# Patient Record
Sex: FEMALE | Race: WHITE | Employment: FULL TIME | URBAN - METROPOLITAN AREA
[De-identification: names, ages, dates, MRNs, and addresses within clinical notes are randomized per-mention and may not be internally consistent; named-entity substitution may affect disease eponyms.]

---

## 2024-03-05 ENCOUNTER — HOSPITAL ENCOUNTER (EMERGENCY)
Age: 19
Discharge: HOME | End: 2024-03-06
Payer: COMMERCIAL

## 2024-03-05 DIAGNOSIS — R10.9 ABDOMINAL PAIN, UNSPECIFIED ABDOMINAL LOCATION: Primary | ICD-10-CM

## 2024-03-05 LAB
ALBUMIN SERPL-MCNC: 4.4 G/DL (ref 3.4–5)
ALBUMIN/GLOB SERPL: 1.7 {RATIO} (ref 1.1–2.2)
ALP SERPL-CCNC: 49 U/L (ref 40–129)
ALT SERPL-CCNC: 10 U/L (ref 10–40)
ANION GAP SERPL CALCULATED.3IONS-SCNC: 11 MMOL/L (ref 3–16)
AST SERPL-CCNC: 17 U/L (ref 15–37)
BASOPHILS # BLD: 0.1 K/UL (ref 0–0.2)
BASOPHILS NFR BLD: 1.2 %
BILIRUB SERPL-MCNC: 0.5 MG/DL (ref 0–1)
BUN SERPL-MCNC: 9 MG/DL (ref 7–20)
CALCIUM SERPL-MCNC: 8.9 MG/DL (ref 8.3–10.6)
CHLORIDE SERPL-SCNC: 104 MMOL/L (ref 99–110)
CO2 SERPL-SCNC: 25 MMOL/L (ref 21–32)
CREAT SERPL-MCNC: 0.6 MG/DL (ref 0.6–1.1)
DEPRECATED RDW RBC AUTO: 13.6 % (ref 12.4–15.4)
EOSINOPHIL # BLD: 0.3 K/UL (ref 0–0.6)
EOSINOPHIL NFR BLD: 3.8 %
GFR SERPLBLD CREATININE-BSD FMLA CKD-EPI: >60 ML/MIN/{1.73_M2}
GLUCOSE SERPL-MCNC: 80 MG/DL (ref 70–99)
HCG SERPL QL: NEGATIVE
HCT VFR BLD AUTO: 39.8 % (ref 36–48)
HGB BLD-MCNC: 13.3 G/DL (ref 12–16)
LIPASE SERPL-CCNC: 46 U/L (ref 13–60)
LYMPHOCYTES # BLD: 2.7 K/UL (ref 1–5.1)
LYMPHOCYTES NFR BLD: 39.6 %
MCH RBC QN AUTO: 30.4 PG (ref 26–34)
MCHC RBC AUTO-ENTMCNC: 33.5 G/DL (ref 31–36)
MCV RBC AUTO: 90.8 FL (ref 80–100)
MONOCYTES # BLD: 0.5 K/UL (ref 0–1.3)
MONOCYTES NFR BLD: 7.5 %
NEUTROPHILS # BLD: 3.2 K/UL (ref 1.7–7.7)
NEUTROPHILS NFR BLD: 47.9 %
PLATELET # BLD AUTO: 327 K/UL (ref 135–450)
PMV BLD AUTO: 6.4 FL (ref 5–10.5)
POTASSIUM SERPL-SCNC: 3.5 MMOL/L (ref 3.5–5.1)
PROT SERPL-MCNC: 7 G/DL (ref 6.4–8.2)
RBC # BLD AUTO: 4.38 M/UL (ref 4–5.2)
SODIUM SERPL-SCNC: 140 MMOL/L (ref 136–145)
WBC # BLD AUTO: 6.7 K/UL (ref 4–11)

## 2024-03-05 PROCEDURE — 83735 ASSAY OF MAGNESIUM: CPT

## 2024-03-05 PROCEDURE — 99285 EMERGENCY DEPT VISIT HI MDM: CPT

## 2024-03-05 PROCEDURE — 84703 CHORIONIC GONADOTROPIN ASSAY: CPT

## 2024-03-05 PROCEDURE — 85025 COMPLETE CBC W/AUTO DIFF WBC: CPT

## 2024-03-05 PROCEDURE — 80053 COMPREHEN METABOLIC PANEL: CPT

## 2024-03-05 PROCEDURE — 83690 ASSAY OF LIPASE: CPT

## 2024-03-05 ASSESSMENT — PAIN - FUNCTIONAL ASSESSMENT: PAIN_FUNCTIONAL_ASSESSMENT: 0-10

## 2024-03-05 ASSESSMENT — PAIN SCALES - GENERAL: PAINLEVEL_OUTOF10: 7

## 2024-03-06 ENCOUNTER — APPOINTMENT (OUTPATIENT)
Dept: CT IMAGING | Age: 19
End: 2024-03-06
Payer: COMMERCIAL

## 2024-03-06 VITALS
RESPIRATION RATE: 14 BRPM | HEART RATE: 60 BPM | SYSTOLIC BLOOD PRESSURE: 116 MMHG | TEMPERATURE: 98.5 F | OXYGEN SATURATION: 100 % | DIASTOLIC BLOOD PRESSURE: 83 MMHG

## 2024-03-06 LAB
BILIRUB UR QL STRIP.AUTO: NEGATIVE
CLARITY UR: CLEAR
COLOR UR: YELLOW
GLUCOSE UR STRIP.AUTO-MCNC: NEGATIVE MG/DL
HGB UR QL STRIP.AUTO: NEGATIVE
KETONES UR STRIP.AUTO-MCNC: NEGATIVE MG/DL
LEUKOCYTE ESTERASE UR QL STRIP.AUTO: NEGATIVE
MAGNESIUM SERPL-MCNC: 2.2 MG/DL (ref 1.8–2.4)
NITRITE UR QL STRIP.AUTO: NEGATIVE
PH UR STRIP.AUTO: 6.5 [PH] (ref 5–8)
PROT UR STRIP.AUTO-MCNC: NEGATIVE MG/DL
SP GR UR STRIP.AUTO: 1.01 (ref 1–1.03)
UA COMPLETE W REFLEX CULTURE PNL UR: NORMAL
UA DIPSTICK W REFLEX MICRO PNL UR: NORMAL
URN SPEC COLLECT METH UR: NORMAL
UROBILINOGEN UR STRIP-ACNC: 0.2 E.U./DL

## 2024-03-06 PROCEDURE — 2580000003 HC RX 258: Performed by: PHYSICIAN ASSISTANT

## 2024-03-06 PROCEDURE — 74177 CT ABD & PELVIS W/CONTRAST: CPT

## 2024-03-06 PROCEDURE — 6360000004 HC RX CONTRAST MEDICATION: Performed by: PHYSICIAN ASSISTANT

## 2024-03-06 PROCEDURE — 81003 URINALYSIS AUTO W/O SCOPE: CPT

## 2024-03-06 RX ORDER — 0.9 % SODIUM CHLORIDE 0.9 %
1000 INTRAVENOUS SOLUTION INTRAVENOUS ONCE
Status: COMPLETED | OUTPATIENT
Start: 2024-03-06 | End: 2024-03-06

## 2024-03-06 RX ORDER — ONDANSETRON 4 MG/1
4 TABLET, FILM COATED ORAL 3 TIMES DAILY PRN
Qty: 15 TABLET | Refills: 0 | Status: SHIPPED | OUTPATIENT
Start: 2024-03-06

## 2024-03-06 RX ORDER — DICYCLOMINE HYDROCHLORIDE 10 MG/1
10 CAPSULE ORAL
Qty: 120 CAPSULE | Refills: 0 | Status: SHIPPED | OUTPATIENT
Start: 2024-03-06

## 2024-03-06 RX ADMIN — SODIUM CHLORIDE 1000 ML: 9 INJECTION, SOLUTION INTRAVENOUS at 01:08

## 2024-03-06 RX ADMIN — IOPAMIDOL 75 ML: 755 INJECTION, SOLUTION INTRAVENOUS at 00:48

## 2024-03-06 NOTE — ED NOTES
Writer reviewed discharge instructions, medications, and follow-up with Gastroenterology; patient verbalized understanding. Patient's IV removed with no complications with dressing in place. Patient stable, ambulatory with steady gait, GCS 15, no signs/ symptoms of acute distress, respirations unlabored, and with family. Patient discharged with documented belongings.

## 2024-03-09 NOTE — ED PROVIDER NOTES
[facility]    CHIEF COMPLAINT  Abdominal Pain (Pt to ED for c/o lower abdominal pain, pt states she is bloated. Pt states her last BM was this morning. )      SHARED SERVICE VISIT  Evaluated by AYALA.  My supervising physician was available for consultation.    HISTORY OF PRESENT ILLNESS  Petty Forde is a 19 y.o. female who presents to the ED complaining of lower abdominal pain with nausea and bloating.  Is having normal bowel movements to include 1 this morning.  She is experiencing intermittent lower abdominal pain and cramping ongoing for several weeks.  Has tried medications which did not help.  She is also experiencing intermittent nausea with no vomiting.  Denies any headache, body ache, fevers or chills.  Denies any coughing or sneezing.  Denies any sore throat or congestion.  Denies any vision changes or dizziness.  Denies any chest pain, shortness of breath, dyspnea on exertion.  Denies any urinary symptoms.  Denies any diarrhea or bloody stools.  Denies any new onset back pain.  Denies any recent travel or sick contacts.  No other complaints, modifying factors or associated symptoms.     Nursing notes reviewed.   History reviewed. No pertinent past medical history.  History reviewed. No pertinent surgical history.  History reviewed. No pertinent family history.  Social History     Socioeconomic History    Marital status: Single     Spouse name: Not on file    Number of children: Not on file    Years of education: Not on file    Highest education level: Not on file   Occupational History    Not on file   Tobacco Use    Smoking status: Never    Smokeless tobacco: Never   Substance and Sexual Activity    Alcohol use: Yes    Drug use: Yes     Types: Marijuana (Weed)    Sexual activity: Not on file   Other Topics Concern    Not on file   Social History Narrative    Not on file     Social Determinants of Health     Financial Resource Strain: Not on file   Food